# Patient Record
(demographics unavailable — no encounter records)

---

## 2024-11-18 NOTE — PHYSICAL EXAM
[Well Developed] : well developed [Well Nourished] : well nourished [No Acute Distress] : no acute distress [Obese] : obese [Normal Conjunctiva] : normal conjunctiva [Normal Venous Pressure] : normal venous pressure [No Carotid Bruit] : no carotid bruit [Normal S1, S2] : normal S1, S2 [No Murmur] : no murmur [No Rub] : no rub [No Gallop] : no gallop [Clear Lung Fields] : clear lung fields [Good Air Entry] : good air entry [No Respiratory Distress] : no respiratory distress  [Soft] : abdomen soft [Non Tender] : non-tender [No Masses/organomegaly] : no masses/organomegaly [Normal Bowel Sounds] : normal bowel sounds [Normal Gait] : normal gait [No Edema] : no edema [No Cyanosis] : no cyanosis [No Clubbing] : no clubbing [No Varicosities] : no varicosities [Moves all extremities] : moves all extremities [No Focal Deficits] : no focal deficits [Normal Speech] : normal speech [Alert and Oriented] : alert and oriented [Normal memory] : normal memory [de-identified] : mild petechial rash over bilateral shins

## 2024-11-18 NOTE — HISTORY OF PRESENT ILLNESS
[FreeTextEntry1] : 62F h/o obesity (BMI 36), HTN, HLD, DM2, gallstones, recent diffuse rash on her legs unclear if allergic reaction was at [facility] and lab work with bandemia (7%) and PLT 130K pending eval. with dermatology and hematology, EKG with ST abnormality, presents for cardiology evaluation.   Patient presents with her  for the visit.  Reports she was drinking a electrolytes powder recently and had intolerance with redness in her legs went to [facility] and with cold sensitivity, was discharged on antihistamine and prednisone. Denies chest pain or shortness of breath but no routine exercise, she has no prior cardiac testing.   Recent lab , , HDL 42, - not adherent with statin use A1c 6.9  Father with CAD/stents Nonsmoker, no alcohol use  Working in [employer]

## 2024-11-18 NOTE — DISCUSSION/SUMMARY
[FreeTextEntry1] : 62F h/o obesity (BMI 36), HTN, HLD, DM2, gallstones, recent diffuse rash on her legs unclear if allergic reaction was at [facility] and lab work with bandemia (7%) and PLT 130K pending eval. with dermatology and hematology, EKG with ST abnormality, presents for cardiology evaluation.   No anginal complains but EKG with LVH and T-wave abnormality, overall sedentary no routine exercise, will obtain Echocardiogram to assess structural heart function, await completion of hematology eval. prior to consideration of ischemic workup with either cardiac CTA vs. nuclear stress test if Echo is normal.    Abnormal EKG- await Echo, evetual ischemic workup if no active hematological issue  HTN- at goal on amlodipine, lisinopril and HCTZ  DM2- on metformin  HLD- not at goal as not been adherent with statin use, need to restart with goal LDL <70  Obesity- need dieting and weight loss  Abnormal CBC, recent legs rash- pending hematology eval.    Follow up in 2 months.  [EKG obtained to assist in diagnosis and management of assessed problem(s)] : EKG obtained to assist in diagnosis and management of assessed problem(s)